# Patient Record
Sex: MALE | Race: WHITE | RURAL
[De-identification: names, ages, dates, MRNs, and addresses within clinical notes are randomized per-mention and may not be internally consistent; named-entity substitution may affect disease eponyms.]

---

## 2022-10-17 ENCOUNTER — OFFICE VISIT (OUTPATIENT)
Dept: FAMILY MEDICINE CLINIC | Age: 27
End: 2022-10-17
Payer: COMMERCIAL

## 2022-10-17 VITALS
TEMPERATURE: 98.9 F | BODY MASS INDEX: 20.11 KG/M2 | HEIGHT: 69 IN | DIASTOLIC BLOOD PRESSURE: 80 MMHG | RESPIRATION RATE: 18 BRPM | OXYGEN SATURATION: 99 % | HEART RATE: 78 BPM | SYSTOLIC BLOOD PRESSURE: 142 MMHG | WEIGHT: 135.8 LBS

## 2022-10-17 DIAGNOSIS — F41.9 ANXIETY: ICD-10-CM

## 2022-10-17 DIAGNOSIS — J30.89 ALLERGIC RHINITIS DUE TO OTHER ALLERGIC TRIGGER, UNSPECIFIED SEASONALITY: Primary | ICD-10-CM

## 2022-10-17 PROCEDURE — G8484 FLU IMMUNIZE NO ADMIN: HCPCS | Performed by: NURSE PRACTITIONER

## 2022-10-17 PROCEDURE — G8420 CALC BMI NORM PARAMETERS: HCPCS | Performed by: NURSE PRACTITIONER

## 2022-10-17 PROCEDURE — 1036F TOBACCO NON-USER: CPT | Performed by: NURSE PRACTITIONER

## 2022-10-17 PROCEDURE — G8427 DOCREV CUR MEDS BY ELIG CLIN: HCPCS | Performed by: NURSE PRACTITIONER

## 2022-10-17 PROCEDURE — 99203 OFFICE O/P NEW LOW 30 MIN: CPT | Performed by: NURSE PRACTITIONER

## 2022-10-17 RX ORDER — HYDROXYZINE PAMOATE 25 MG/1
25 CAPSULE ORAL 2 TIMES DAILY PRN
Qty: 60 CAPSULE | Refills: 3 | Status: SHIPPED | OUTPATIENT
Start: 2022-10-17 | End: 2022-10-17

## 2022-10-17 RX ORDER — HYDROXYZINE PAMOATE 25 MG/1
25 CAPSULE ORAL 3 TIMES DAILY PRN
Qty: 60 CAPSULE | Refills: 1 | Status: SHIPPED | OUTPATIENT
Start: 2022-10-17 | End: 2022-11-16

## 2022-10-17 RX ORDER — FEXOFENADINE HCL 180 MG/1
180 TABLET ORAL DAILY
Qty: 30 TABLET | Refills: 0 | Status: CANCELLED | OUTPATIENT
Start: 2022-10-17 | End: 2022-11-16

## 2022-10-17 RX ORDER — LORATADINE 10 MG/1
10 TABLET ORAL DAILY
Qty: 30 TABLET | Refills: 3 | Status: SHIPPED | OUTPATIENT
Start: 2022-10-17

## 2022-10-17 RX ORDER — FLUTICASONE PROPIONATE 50 MCG
1 SPRAY, SUSPENSION (ML) NASAL DAILY
Qty: 16 G | Refills: 0 | Status: SHIPPED | OUTPATIENT
Start: 2022-10-17

## 2022-10-17 SDOH — ECONOMIC STABILITY: FOOD INSECURITY: WITHIN THE PAST 12 MONTHS, THE FOOD YOU BOUGHT JUST DIDN'T LAST AND YOU DIDN'T HAVE MONEY TO GET MORE.: NEVER TRUE

## 2022-10-17 SDOH — ECONOMIC STABILITY: FOOD INSECURITY: WITHIN THE PAST 12 MONTHS, YOU WORRIED THAT YOUR FOOD WOULD RUN OUT BEFORE YOU GOT MONEY TO BUY MORE.: NEVER TRUE

## 2022-10-17 ASSESSMENT — ENCOUNTER SYMPTOMS
COUGH: 1
SINUS PRESSURE: 0
WHEEZING: 0
SINUS PAIN: 0
CHEST TIGHTNESS: 0
SHORTNESS OF BREATH: 0
RHINORRHEA: 1

## 2022-10-17 ASSESSMENT — PATIENT HEALTH QUESTIONNAIRE - PHQ9
1. LITTLE INTEREST OR PLEASURE IN DOING THINGS: 0
SUM OF ALL RESPONSES TO PHQ9 QUESTIONS 1 & 2: 0
SUM OF ALL RESPONSES TO PHQ QUESTIONS 1-9: 0
2. FEELING DOWN, DEPRESSED OR HOPELESS: 0
SUM OF ALL RESPONSES TO PHQ QUESTIONS 1-9: 0

## 2022-10-17 ASSESSMENT — SOCIAL DETERMINANTS OF HEALTH (SDOH): HOW HARD IS IT FOR YOU TO PAY FOR THE VERY BASICS LIKE FOOD, HOUSING, MEDICAL CARE, AND HEATING?: NOT HARD AT ALL

## 2022-10-17 NOTE — PROGRESS NOTES
SUBJECTIVE:    Heidy Lambert is a 28 y.o. male    Patient here to [facility]. Patient moved here from [place] approx 1 year ago with his mother. Patient reports a history of allergies and it has been worse over the last few months. He reports frequent sneezing, coughing, and nasal congestion. His primary complaint is a cough at night. He states in the past he has taken Claritin D and allegra. C/o frequent throat clearing. 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 Mother reports he has never been evaluated by ENT. He reports no PMH other than Anxiety. He does not like the idea of taking daily medications. He has been under the care of a  during childhood for Anxiety and possible ADHD/Autism. Mother reports he never really fit any of those profiles. Denies smoking. Pt reports he drinks approx 2-3 beers per day. He reports he works at the [employer] and he enjoys making [employer]. Cough  This is a chronic problem. The current episode started more than 1 month ago. The problem has been waxing and waning. The cough is Productive of sputum (white). Associated symptoms include nasal congestion, postnasal drip and rhinorrhea. Pertinent negatives include no ear pain, shortness of breath or wheezing. Associated symptoms comments: + sneezing  . Nothing aggravates the symptoms. He has tried nothing for the symptoms. His past medical history is significant for environmental allergies. There is no history of asthma or COPD. Chief Complaint   Patient presents with    [facility]    Cough        Review of Systems   HENT:  Positive for postnasal drip, rhinorrhea and sneezing. Negative for ear pain, sinus pressure and sinus pain. Respiratory:  Positive for cough. Negative for chest tightness, shortness of breath and wheezing. Allergic/Immunologic: Positive for environmental allergies.    Psychiatric/Behavioral:  Negative for agitation, behavioral problems, decreased concentration, dysphoric mood and sleep disturbance. The patient is nervous/anxious. OBJECTIVE:    BP (!) 142/80   Pulse 78   Temp 98.9 °F (37.2 °C) (Infrared)   Resp 18   Ht 5' 9\" (1.753 m)   Wt 135 lb 12.8 oz (61.6 kg)   SpO2 99% Comment: RA  BMI 20.05 kg/m²    Physical Exam  Vitals and nursing note reviewed. Constitutional:       Appearance: Normal appearance. He is well-developed. HENT:      Head: Normocephalic. Eyes:      Extraocular Movements: Extraocular movements intact. Conjunctiva/sclera: Conjunctivae normal.      Pupils: Pupils are equal, round, and reactive to light. Neck:      Thyroid: No thyromegaly. Vascular: No carotid bruit. Cardiovascular:      Rate and Rhythm: Normal rate and regular rhythm. Heart sounds: Normal heart sounds. No murmur heard. Pulmonary:      Effort: Pulmonary effort is normal.      Breath sounds: Normal breath sounds. Skin:     General: Skin is warm and dry. Capillary Refill: Capillary refill takes less than 2 seconds. Neurological:      Mental Status: He is alert and oriented to person, place, and time. Psychiatric:         Attention and Perception: Attention and perception normal.         Mood and Affect: Affect normal. Mood is anxious. Mood is not depressed. Behavior: Behavior normal.         Thought Content: Thought content normal.         Judgment: Judgment normal.       ASSESSMENT/PLAN:   Dominique Hylton was seen today for establish care and cough. Diagnoses and all orders for this visit:    Allergic rhinitis due to other allergic trigger, unspecified seasonality  -     fluticasone (FLONASE) 50 MCG/ACT nasal spray; 1 spray by Each Nostril route daily  -     loratadine (CLARITIN) 10 MG tablet; Take 1 tablet by mouth daily  -     External Referral To ENT  -     Discontinue: hydrOXYzine pamoate (VISTARIL) 25 MG capsule;  Take 1 capsule by mouth 2 times daily as needed for

## 2022-10-17 NOTE — PROGRESS NOTES
Chief Complaint   Patient presents with    Establish Care    Cough     Patient here to establish care. Patient moved here from [place] approx 1 year ago. Patient reports a history of allergies and it has been worse over the last few months. He reports frequent sneezing, coughing, and nasal drainage. He states in the past he has taken Claritin D and allegra.

## 2022-11-02 ENCOUNTER — OFFICE VISIT (OUTPATIENT)
Dept: FAMILY MEDICINE CLINIC | Age: 27
End: 2022-11-02
Payer: COMMERCIAL

## 2022-11-02 VITALS
DIASTOLIC BLOOD PRESSURE: 89 MMHG | OXYGEN SATURATION: 98 % | RESPIRATION RATE: 18 BRPM | HEART RATE: 77 BPM | SYSTOLIC BLOOD PRESSURE: 153 MMHG | WEIGHT: 134.2 LBS | HEIGHT: 69 IN | BODY MASS INDEX: 19.88 KG/M2

## 2022-11-02 DIAGNOSIS — J40 BRONCHITIS: ICD-10-CM

## 2022-11-02 DIAGNOSIS — R11.0 NAUSEA: ICD-10-CM

## 2022-11-02 DIAGNOSIS — R05.1 ACUTE COUGH: Primary | ICD-10-CM

## 2022-11-02 LAB
INFLUENZA A ANTIBODY: NEGATIVE
INFLUENZA B ANTIBODY: NEGATIVE

## 2022-11-02 PROCEDURE — 87804 INFLUENZA ASSAY W/OPTIC: CPT | Performed by: NURSE PRACTITIONER

## 2022-11-02 PROCEDURE — G8420 CALC BMI NORM PARAMETERS: HCPCS | Performed by: NURSE PRACTITIONER

## 2022-11-02 PROCEDURE — 99213 OFFICE O/P EST LOW 20 MIN: CPT | Performed by: NURSE PRACTITIONER

## 2022-11-02 PROCEDURE — G8484 FLU IMMUNIZE NO ADMIN: HCPCS | Performed by: NURSE PRACTITIONER

## 2022-11-02 PROCEDURE — G8427 DOCREV CUR MEDS BY ELIG CLIN: HCPCS | Performed by: NURSE PRACTITIONER

## 2022-11-02 PROCEDURE — 1036F TOBACCO NON-USER: CPT | Performed by: NURSE PRACTITIONER

## 2022-11-02 RX ORDER — ONDANSETRON 4 MG/1
4 TABLET, FILM COATED ORAL 3 TIMES DAILY PRN
Qty: 15 TABLET | Refills: 0 | Status: SHIPPED | OUTPATIENT
Start: 2022-11-02

## 2022-11-02 RX ORDER — AZITHROMYCIN 250 MG/1
250 TABLET, FILM COATED ORAL SEE ADMIN INSTRUCTIONS
Qty: 6 TABLET | Refills: 0 | Status: SHIPPED | OUTPATIENT
Start: 2022-11-02 | End: 2022-11-07

## 2022-11-02 ASSESSMENT — ENCOUNTER SYMPTOMS
ABDOMINAL DISTENTION: 1
VOMITING: 0
RHINORRHEA: 1
NAUSEA: 1
SORE THROAT: 1
COUGH: 1

## 2022-11-02 NOTE — PROGRESS NOTES
SUBJECTIVE:    Alexis Alvarez is a 28 y.o. male    Patient here today with c/o generalized body aches, low grade fever, abdominal pain, nausea. Symptoms started 1 week ago with a noticeable increase in nonproductive cough. The next day is when the other symptoms started. He states his max temp was 102 but generally stayed around 99.5. Patient is now coughing up yellow sputum. He states he does have severe chronic allergies for which he takes flonase and claritin but he has not been taking throughout this illness. He also states that he is feeling somewhat better today but came to scheduled appointment because he has felt so bad over the last week. Mother present with patient at this visit. Patient states that he does have some anxiety and thinks that his blood pressure may be somewhat elevated due to having to come to the office today. Generalized Body Aches  Associated symptoms include coughing, a fever, myalgias, nausea and a sore throat (scratchy). Pertinent negatives include no vomiting. He has tried acetaminophen and NSAIDs (OTC robitussin) for the symptoms. The treatment provided significant relief. Chief Complaint   Patient presents with    Cough    Generalized Body Aches    Abdominal Pain    Fever     Has had a low grade     Chills        Review of Systems   Constitutional:  Positive for fever. HENT:  Positive for postnasal drip, rhinorrhea and sore throat (scratchy). Respiratory:  Positive for cough. Gastrointestinal:  Positive for abdominal distention and nausea. Negative for vomiting. Musculoskeletal:  Positive for myalgias. All other systems reviewed and are negative. OBJECTIVE:    BP (!) 153/89   Pulse 77   Resp 18   Ht 5' 9\" (1.753 m)   Wt 134 lb 3.2 oz (60.9 kg)   SpO2 98% Comment: RA  BMI 19.82 kg/m²    Physical Exam  Vitals and nursing note reviewed. Constitutional:       Appearance: He is normal weight. He is not ill-appearing.    Cardiovascular:      Rate and Rhythm: Normal rate and regular rhythm. Heart sounds: Normal heart sounds. Pulmonary:      Effort: Pulmonary effort is normal.      Breath sounds: Normal breath sounds. Abdominal:      General: Abdomen is flat. Bowel sounds are normal. There is no distension. Tenderness: There is no abdominal tenderness. Skin:     General: Skin is warm and dry. Neurological:      Mental Status: He is alert and oriented to person, place, and time. Psychiatric:         Mood and Affect: Mood normal.         Behavior: Behavior normal.         Thought Content: Thought content normal.         Judgment: Judgment normal.   Patient has a nagging cough while I was in room seeing him. He did have some sputum production at this time that was yellow in color. ASSESSMENT/PLAN:   1. Acute cough  -     POCT Influenza A/B-Negative  2. Bronchitis  -     azithromycin (ZITHROMAX) 250 MG tablet; Take 1 tablet by mouth See Admin Instructions for 5 days 500mg on day 1 followed by 250mg on days 2 - 5, Disp-6 tablet, R-0Normal  3. Nausea  -     azithromycin (ZITHROMAX) 250 MG tablet; Take 1 tablet by mouth See Admin Instructions for 5 days 500mg on day 1 followed by 250mg on days 2 - 5, Disp-6 tablet, R-0Normal    Patient to continue taking OTC robitussin for cough and congestion. Will take zofran PRN nausea. Can continue Advil and tylenol for pain. Patient provided blood pressure cuff. Advised to take blood pressure at different times throughout the day and keep a running log. Advised to follow up with PCP in 2-4 weeks to manage blood pressure and to bring log to appointment. Patient and mother verbalized understanding of education. Prior to Visit Medications    Medication Sig Taking?  Authorizing Provider   loratadine (CLARITIN) 10 MG tablet Take 1 tablet by mouth daily Yes Melaldair Garvey APRN - NP   fluticasone (FLONASE) 50 MCG/ACT nasal spray 1 spray by Each Nostril route daily  Patient not taking: Reported on 11/2/2022 FERDINAND Gomez NP   hydrOXYzine pamoate (VISTARIL) 25 MG capsule Take 1 capsule by mouth 3 times daily as needed for Anxiety  Patient not taking: Reported on 11/2/2022  FERDINAND Gomez NP

## 2022-12-14 DIAGNOSIS — J30.89 ALLERGIC RHINITIS DUE TO OTHER ALLERGIC TRIGGER, UNSPECIFIED SEASONALITY: ICD-10-CM

## 2022-12-15 RX ORDER — FLUTICASONE PROPIONATE 50 MCG
SPRAY, SUSPENSION (ML) NASAL
Qty: 16 G | Refills: 0 | Status: SHIPPED | OUTPATIENT
Start: 2022-12-15